# Patient Record
Sex: MALE | Race: OTHER | HISPANIC OR LATINO | URBAN - METROPOLITAN AREA
[De-identification: names, ages, dates, MRNs, and addresses within clinical notes are randomized per-mention and may not be internally consistent; named-entity substitution may affect disease eponyms.]

---

## 2018-12-12 ENCOUNTER — EMERGENCY (EMERGENCY)
Facility: HOSPITAL | Age: 33
LOS: 1 days | Discharge: HOME | End: 2018-12-12
Attending: EMERGENCY MEDICINE
Payer: SELF-PAY

## 2018-12-12 VITALS
RESPIRATION RATE: 18 BRPM | HEART RATE: 73 BPM | TEMPERATURE: 98 F | WEIGHT: 139.99 LBS | DIASTOLIC BLOOD PRESSURE: 50 MMHG | HEIGHT: 65 IN | SYSTOLIC BLOOD PRESSURE: 107 MMHG | OXYGEN SATURATION: 99 %

## 2018-12-12 PROCEDURE — 12001 RPR S/N/AX/GEN/TRNK 2.5CM/<: CPT

## 2018-12-12 PROCEDURE — 73130 X-RAY EXAM OF HAND: CPT | Mod: 26,RT

## 2018-12-12 PROCEDURE — 90471 IMMUNIZATION ADMIN: CPT

## 2018-12-12 PROCEDURE — 90715 TDAP VACCINE 7 YRS/> IM: CPT

## 2018-12-12 PROCEDURE — 73130 X-RAY EXAM OF HAND: CPT

## 2018-12-12 PROCEDURE — 99283 EMERGENCY DEPT VISIT LOW MDM: CPT | Mod: 25

## 2018-12-12 RX ORDER — LIDOCAINE HCL 20 MG/ML
4 VIAL (ML) INJECTION ONCE
Qty: 0 | Refills: 0 | Status: DISCONTINUED | OUTPATIENT
Start: 2018-12-12 | End: 2018-12-17

## 2018-12-12 RX ORDER — OXYCODONE AND ACETAMINOPHEN 5; 325 MG/1; MG/1
1 TABLET ORAL ONCE
Qty: 0 | Refills: 0 | Status: DISCONTINUED | OUTPATIENT
Start: 2018-12-12 | End: 2018-12-12

## 2018-12-12 RX ORDER — IBUPROFEN 200 MG
1 TABLET ORAL
Qty: 25 | Refills: 0 | OUTPATIENT
Start: 2018-12-12

## 2018-12-12 RX ORDER — CEPHALEXIN 500 MG
500 CAPSULE ORAL ONCE
Qty: 0 | Refills: 0 | Status: COMPLETED | OUTPATIENT
Start: 2018-12-12 | End: 2018-12-12

## 2018-12-12 RX ORDER — TETANUS TOXOID, REDUCED DIPHTHERIA TOXOID AND ACELLULAR PERTUSSIS VACCINE, ADSORBED 5; 2.5; 8; 8; 2.5 [IU]/.5ML; [IU]/.5ML; UG/.5ML; UG/.5ML; UG/.5ML
0.5 SUSPENSION INTRAMUSCULAR ONCE
Qty: 0 | Refills: 0 | Status: COMPLETED | OUTPATIENT
Start: 2018-12-12 | End: 2018-12-12

## 2018-12-12 RX ORDER — CEPHALEXIN 500 MG
1 CAPSULE ORAL
Qty: 21 | Refills: 0 | OUTPATIENT
Start: 2018-12-12 | End: 2018-12-18

## 2018-12-12 RX ADMIN — Medication 500 MILLIGRAM(S): at 19:26

## 2018-12-12 RX ADMIN — OXYCODONE AND ACETAMINOPHEN 1 TABLET(S): 5; 325 TABLET ORAL at 17:41

## 2018-12-12 RX ADMIN — TETANUS TOXOID, REDUCED DIPHTHERIA TOXOID AND ACELLULAR PERTUSSIS VACCINE, ADSORBED 0.5 MILLILITER(S): 5; 2.5; 8; 8; 2.5 SUSPENSION INTRAMUSCULAR at 17:41

## 2018-12-12 NOTE — ED STATDOCS - NS_ ATTENDINGSCRIBEDETAILS _ED_A_ED_FT
I, Kris Sandoval, performed the initial face to face bedside interview with this patient regarding history of present illness, review of symptoms and relevant past medical, social and family history.  I completed an independent physical examination.  I was the initial provider who evaluated this patient. I have signed out the follow up of any pending tests (i.e. labs, radiological studies) to the ACP.  I have communicated the patient’s plan of care and disposition with the ACP.  The history, relevant review of systems, past medical and surgical history, medical decision making, and physical examination was documented by the scribe in my presence and I attest to the accuracy of the documentation.
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
## 2018-12-12 NOTE — ED STATDOCS - CARE PLAN
Principal Discharge DX:	Laceration of left little finger without foreign body without damage to nail, initial encounter

## 2018-12-12 NOTE — ED ADULT TRIAGE NOTE - CHIEF COMPLAINT QUOTE
Pt with lac to right 5th digit. Pt closed the door on his finger.
no chest pain, no cough, and no shortness of breath.

## 2018-12-12 NOTE — ED STATDOCS - PROGRESS NOTE DETAILS
pt is seen and evaluated by attending- hx and ROS and PE and plan agreed by atteing    wound closed - keflex order - With F/u

## 2018-12-12 NOTE — ED STATDOCS - MUSCULOSKELETAL, MLM
right hand exam: 2cm laceration to the palmar side of the right 5th DIP right hand exam: 2cm laceration to the palmar side of the right 5th DIP, ROM limited secondary to pain, no tendon involvement

## 2021-09-17 NOTE — ED PROCEDURE NOTE - SKIN SUTURE
interrupted
Vital Signs Last 24 Hrs  T(C): 36.8 (17 Sep 2021 11:40), Max: 36.8 (16 Sep 2021 22:07)  T(F): 98.2 (17 Sep 2021 11:40), Max: 98.3 (16 Sep 2021 22:07)  HR: 67 (17 Sep 2021 13:27) (50 - 98)  BP: 130/87 (17 Sep 2021 13:27) (112/78 - 134/67)  BP(mean): --  RR: 18 (17 Sep 2021 13:27) (16 - 18)  SpO2: 100% (17 Sep 2021 13:27) (98% - 100%)